# Patient Record
Sex: MALE | Race: BLACK OR AFRICAN AMERICAN
[De-identification: names, ages, dates, MRNs, and addresses within clinical notes are randomized per-mention and may not be internally consistent; named-entity substitution may affect disease eponyms.]

---

## 2018-09-14 ENCOUNTER — HOSPITAL ENCOUNTER (EMERGENCY)
Age: 63
Discharge: LEFT BEFORE DISCHARGE | End: 2018-09-14

## 2018-09-14 ENCOUNTER — HOSPITAL ENCOUNTER (EMERGENCY)
Dept: HOSPITAL 91 - FTE | Age: 63
Discharge: LEFT BEFORE DISCHARGE | End: 2018-09-14
Payer: SELF-PAY

## 2018-09-14 DIAGNOSIS — Z53.21: Primary | ICD-10-CM

## 2019-06-11 ENCOUNTER — HOSPITAL ENCOUNTER (OUTPATIENT)
Dept: HOSPITAL 91 - GIL | Age: 64
Discharge: HOME | End: 2019-06-11
Payer: COMMERCIAL

## 2019-06-11 ENCOUNTER — HOSPITAL ENCOUNTER (OUTPATIENT)
Dept: HOSPITAL 10 - GIL | Age: 64
Discharge: HOME | End: 2019-06-11
Attending: INTERNAL MEDICINE
Payer: COMMERCIAL

## 2019-06-11 VITALS — RESPIRATION RATE: 17 BRPM | DIASTOLIC BLOOD PRESSURE: 90 MMHG | SYSTOLIC BLOOD PRESSURE: 138 MMHG | HEART RATE: 64 BPM

## 2019-06-11 VITALS — SYSTOLIC BLOOD PRESSURE: 133 MMHG | DIASTOLIC BLOOD PRESSURE: 83 MMHG | HEART RATE: 66 BPM | RESPIRATION RATE: 18 BRPM

## 2019-06-11 VITALS
HEIGHT: 73 IN | BODY MASS INDEX: 33.25 KG/M2 | WEIGHT: 250.89 LBS | HEIGHT: 73 IN | WEIGHT: 250.89 LBS | BODY MASS INDEX: 33.25 KG/M2

## 2019-06-11 DIAGNOSIS — K64.8: ICD-10-CM

## 2019-06-11 DIAGNOSIS — K51.90: Primary | ICD-10-CM

## 2019-06-11 PROCEDURE — 45378 DIAGNOSTIC COLONOSCOPY: CPT

## 2019-06-11 PROCEDURE — 88305 TISSUE EXAM BY PATHOLOGIST: CPT

## 2019-07-12 ENCOUNTER — HOSPITAL ENCOUNTER (EMERGENCY)
Dept: HOSPITAL 10 - FTE | Age: 64
Discharge: HOME | End: 2019-07-12
Payer: COMMERCIAL

## 2019-07-12 ENCOUNTER — HOSPITAL ENCOUNTER (EMERGENCY)
Dept: HOSPITAL 91 - FTE | Age: 64
Discharge: HOME | End: 2019-07-12
Payer: COMMERCIAL

## 2019-07-12 VITALS — SYSTOLIC BLOOD PRESSURE: 146 MMHG | HEART RATE: 84 BPM | RESPIRATION RATE: 16 BRPM | DIASTOLIC BLOOD PRESSURE: 87 MMHG

## 2019-07-12 VITALS
WEIGHT: 251.33 LBS | HEIGHT: 73 IN | BODY MASS INDEX: 33.31 KG/M2 | BODY MASS INDEX: 33.31 KG/M2 | HEIGHT: 73 IN | WEIGHT: 251.33 LBS

## 2019-07-12 DIAGNOSIS — Z96.651: ICD-10-CM

## 2019-07-12 DIAGNOSIS — H61.23: Primary | ICD-10-CM

## 2019-07-12 DIAGNOSIS — J45.909: ICD-10-CM

## 2019-07-12 PROCEDURE — 99283 EMERGENCY DEPT VISIT LOW MDM: CPT

## 2019-07-12 NOTE — ERD
ER Documentation


Chief Complaint


Chief Complaint





pt is bib self with c/o hearing loss x 4 days after jumping in pool, pain





ROS


All systems reviewed and are negative except as per history of present illness.





Medications


Home Meds


Active Scripts


Albuterol Sulfate* (Proair HFA*) 8.5 Gm Hfa.aer.ad, 2 PUFF INH Q4, #1 INHALER


   Prov:CATHLEEN VINES DO         12/21/16


Reported Medications


[Psoriasis Ointment]   No Conflict Check


   6/11/19


[Vitamin D]   No Conflict Check


   6/11/19





Allergies


Allergies:  


Coded Allergies:  


     No Known Drug Allergies (Verified  Allergy, Unknown, 12/21/16)





PMhx/Soc


History of Surgery:  Yes (RIGHT KNEE REPLACEMENT, RIGHT KNEE SX, COLECTOMY)


Anesthesia Reaction:  No


Hx Neurological Disorder:  No


Hx Respiratory Disorders:  Yes (ASTHMA)


Hx Cardiac Disorders:  No


Hx Psychiatric Problems:  No


Hx Miscellaneous Medical Probl:  Yes (PSORIASIS)


Hx Alcohol Use:  No


Hx Substance Use:  Yes (CANNIBIS)


Hx Tobacco Use:  No


Smoking Status:  Never smoker





Physical Exam


Vitals





Vital Signs


  Date      Temp  Pulse  Resp  B/P (MAP)   Pulse Ox  O2          O2 Flow    FiO2


Time                                                 Delivery    Rate


   7/12/19  98.3     84    16      146/87        98


     13:53                          (106)





Physical Exam


Const:   No acute distress


Head:   Atraumatic 


Eyes:    Normal Conjunctiva


ENT:    Normal External Ears, Nose and Mouth.


Neck:               Full range of motion. No meningismus.


Resp:   Clear to auscultation bilaterally


Cardio:   Regular rate and rhythm, no murmurs


Abd:    Soft, non tender, non distended. Normal bowel sounds


Skin:   No petechiae or rashes


Back:   No midline or flank tenderness


Ext:    No cyanosis, or edema


Neur:   Awake and alert


Psych:    Normal Mood and Affect





Departure


Diagnosis:  


   Primary Impression:  


   Impacted cerumen of both ears


Condition:  Stable





Additional Instructions:  


Thank you very much for allowing us to participate in your care. 


Your health and safety is our top priority at [facility].


 


The evaluation in the emergency department has been done to rule out an acute 


emergency.  Chronic, non-life-threatening conditions may have not been 


evaluated; therefore, you need to follow up with a primary care provider in the 


next 48h.  If symptoms persist, worsen or new symptoms develop, then patient 


should return to the ED immediately.





Call your primary care doctor TOMORROW for an appointment during the next 2-4 


days and bring all the information provided. 





Have prescriptions filled and follow precisely the directions on the label. 





If the symptoms get worse and your provider is unavailable, return to the [facility]


[facility] immediately.











FAINA GARCIA MD      Jul 12, 2019 15:27